# Patient Record
Sex: FEMALE | Race: WHITE | NOT HISPANIC OR LATINO | URBAN - NONMETROPOLITAN AREA
[De-identification: names, ages, dates, MRNs, and addresses within clinical notes are randomized per-mention and may not be internally consistent; named-entity substitution may affect disease eponyms.]

---

## 2019-05-23 ENCOUNTER — IMPORTED ENCOUNTER (OUTPATIENT)
Dept: URBAN - NONMETROPOLITAN AREA CLINIC 1 | Facility: CLINIC | Age: 59
End: 2019-05-23

## 2019-05-23 PROBLEM — H52.13: Noted: 2019-05-23

## 2019-05-23 PROCEDURE — 92004 COMPRE OPH EXAM NEW PT 1/>: CPT

## 2019-05-23 PROCEDURE — 92015 DETERMINE REFRACTIVE STATE: CPT

## 2019-05-23 NOTE — PATIENT DISCUSSION
Myopia/Presbyopia OU- Discussed refractive status in detail with patient - Patient states she never liked the progressive lenses she got 6 years ago- Informed patient she can get distance vision only glasses progressive lenses or just use OTC readers depending on what she is doing and what works better for her - Informed patient it takes time to adjust to progressive lenses and it will take time for her to adjust to distance glasses as well.  - New glasses Rx given - Continue to monitor yearly or PRN

## 2022-04-09 ASSESSMENT — TONOMETRY
OD_IOP_MMHG: 14
OS_IOP_MMHG: 14

## 2022-04-09 ASSESSMENT — VISUAL ACUITY
OS_CC: 20/40
OD_CC: 20/70